# Patient Record
Sex: FEMALE | Race: WHITE | Employment: UNEMPLOYED | URBAN - METROPOLITAN AREA
[De-identification: names, ages, dates, MRNs, and addresses within clinical notes are randomized per-mention and may not be internally consistent; named-entity substitution may affect disease eponyms.]

---

## 2019-09-10 LAB
ANTIBODY SCREEN, EXTERNAL: NEGATIVE
CHLAMYDIA, EXTERNAL: NEGATIVE
HBSAG, EXTERNAL: NEGATIVE
HEPATITIS C AB,   EXT: NEGATIVE
HIV, EXTERNAL: NEGATIVE
N. GONORRHEA, EXTERNAL: NEGATIVE
RPR, EXTERNAL: NON REACTIVE
RUBELLA, EXTERNAL: NORMAL
TYPE, ABO & RH, EXTERNAL: NORMAL

## 2020-02-29 LAB — GRBS, EXTERNAL: NEGATIVE

## 2020-03-08 ENCOUNTER — HOSPITAL ENCOUNTER (EMERGENCY)
Age: 20
Discharge: HOME | End: 2020-03-08
Attending: OBSTETRICS & GYNECOLOGY | Admitting: OBSTETRICS & GYNECOLOGY
Payer: MEDICAID

## 2020-03-08 VITALS
DIASTOLIC BLOOD PRESSURE: 64 MMHG | WEIGHT: 169 LBS | TEMPERATURE: 98.2 F | BODY MASS INDEX: 28.85 KG/M2 | SYSTOLIC BLOOD PRESSURE: 121 MMHG | HEART RATE: 91 BPM | HEIGHT: 64 IN

## 2020-03-08 LAB
APPEARANCE UR: CLEAR
BACTERIA URNS QL MICRO: NEGATIVE /HPF
BILIRUB UR QL: NEGATIVE
COLOR UR: NORMAL
EPITH CASTS URNS QL MICRO: NORMAL /LPF
GLUCOSE UR STRIP.AUTO-MCNC: NEGATIVE MG/DL
HGB UR QL STRIP: NEGATIVE
HYALINE CASTS URNS QL MICRO: NORMAL /LPF (ref 0–5)
KETONES UR QL STRIP.AUTO: NEGATIVE MG/DL
LEUKOCYTE ESTERASE UR QL STRIP.AUTO: NEGATIVE
NITRITE UR QL STRIP.AUTO: NEGATIVE
PH UR STRIP: 7 [PH] (ref 5–8)
PROT UR STRIP-MCNC: NEGATIVE MG/DL
RBC #/AREA URNS HPF: NORMAL /HPF (ref 0–5)
SP GR UR REFRACTOMETRY: 1.01 (ref 1–1.03)
UA: UC IF INDICATED,UAUC: NORMAL
UROBILINOGEN UR QL STRIP.AUTO: 0.2 EU/DL (ref 0.2–1)
WBC URNS QL MICRO: NORMAL /HPF (ref 0–4)

## 2020-03-08 PROCEDURE — 81001 URINALYSIS AUTO W/SCOPE: CPT

## 2020-03-08 PROCEDURE — 59025 FETAL NON-STRESS TEST: CPT

## 2020-03-08 RX ORDER — LEVOTHYROXINE SODIUM 50 UG/1
50 TABLET ORAL
COMMUNITY

## 2020-03-08 NOTE — DISCHARGE INSTRUCTIONS
Patient Education   Patient Education   Patient Education        Early Stage of Labor at Home: Care Instructions  Your Care Instructions    If you came to the hospital while in early labor, your doctor may have asked if you want to labor at home until your contractions are stronger. Many women stay at home during early labor. This is often the longest part of the birthing process. It may last up to 2 to 3 days. Contractions are mild to moderate and shorter (about 30 to 45 seconds). You can usually keep talking during them. Contractions may also be irregular, about 5 to 20 minutes apart. They may even stop for a while. It helps to stay as relaxed as you can during this time. You can spend some or all of your early labor at home or anywhere else you may be comfortable. If you live far from the hospital or birthing center, you may want to think about going somewhere nearby so you can get back to the hospital quickly. For some women, there may be benefits to staying home during early labor, such as avoiding medicines or procedures. As labor progresses, you'll shift from early labor to active labor. During this time, contractions get more intense. They occur more often, about every 2 to 3 minutes. They also last longer, about 50 to 70 seconds. You will feel them even when you change positions and walk or move around. It may be hard to tell if you are in active labor. If you aren't sure, call your doctor or midwife. As your labor progresses, check in with your doctor or midwife about when to come back to the hospital or birthing center. You may have special instructions if your water broke or you tested positive for group B strep. Follow-up care is a key part of your treatment and safety. Be sure to make and go to all appointments, and call your doctor if you are having problems. It's also a good idea to know your test results and keep a list of the medicines you take. How can you care for yourself at home?   · Get support. Having a support person with you from early labor until after childbirth can have a positive effect on childbirth. · Find distractions. During early labor, you can walk, play cards, watch TV, or listen to music to help take your mind off your contractions. · Ask your partner, labor , or  for a massage. Shoulder and low back massage during contractions may ease your pain. Strong massage of the back muscles (counterpressure) during contractions may help relieve the pain of back labor. Tell your labor  exactly where to push and how hard to push. · Use imagery. This means using your imagination to decrease your pain. For instance, to help manage pain, picture your contractions as waves rolling over you. Picture a peaceful place, such as a beach or mountain stream, to help you relax between contractions. · Change positions during labor. Walking, kneeling, or sitting on a big rubber ball (birth ball) are good options. · Use focused breathing techniques. Breathing in a rhythm can distract you from pain. · Take a warm shower or bath. Warm water may ease pain and stress. When should you call for help? Call 911 anytime you think you may need emergency care. For example, call if:    · You passed out (lost consciousness).     · You have a seizure.     · You have severe vaginal bleeding.     · You have severe pain in your belly or pelvis.     · You have had fluid gushing or leaking from your vagina and you know or think the umbilical cord is bulging into your vagina. If this happens, immediately get down on your knees so your rear end (buttocks) is higher than your head. This will decrease the pressure on the cord until help arrives.   [facility] your doctor now or seek immediate medical care if:    · You have new or worse signs of preeclampsia, such as:  ? Sudden swelling of your face, hands, or feet. ? New vision problems (such as dimness, blurring, or seeing spots).   ? A severe headache.     · You have any vaginal bleeding.     · You have belly pain or cramping.     · You have a fever.     · You have had regular contractions (with or without pain) for an hour. This means that you have 8 or more within 1 hour or 4 or more in 20 minutes after you change your position and drink fluids.     · You have a sudden release of fluid from your vagina.     · You have low back pain or pelvic pressure that does not go away.     · You notice that your baby has stopped moving or is moving much less than normal.    Watch closely for changes in your health, and be sure to contact your doctor if you have any problems. Where can you learn more? Go to http://cosme-zeyad.info/. Enter D880 in the search box to learn more about \"Early Stage of Labor at Home: Care Instructions. \"  Current as of: May 29, 2019  Content Version: 12.2  © 7136-4517 [employer]. Care instructions adapted under license by [employer] (which disclaims liability or warranty for this information). If you have questions about a medical condition or this instruction, always ask your healthcare professional. [place] [address] any warranty or liability for your use of this information. Danney Vesta Contractions: Care Instructions  Your Care Instructions    Kiet Fu contractions prepare your uterus for labor. Think of them as a \"warm-up\" exercise that your body does. You may begin to feel them between the 28th and 30th weeks of your pregnancy. But they start as early as the 20th week. Kiet Fu contractions usually occur more often during the ninth month. They may go away when you are active and return when you rest. These contractions are like mild contractions of true labor, but they occur less often. (You feel fewer than 8 in an hour.) They don't cause your cervix to open.   It may be hard for you to tell the difference between Danney Vesta contractions and true labor, especially in your first pregnancy. Follow-up care is a key part of your treatment and safety. Be sure to make and go to all appointments, and call your doctor if you are having problems. It's also a good idea to know your test results and keep a list of the medicines you take. How can you care for yourself at home? · Try a warm bath to help relieve muscle tension and reduce pain. · Change positions every 30 minutes. Take breaks if you must sit for a long time. Get up and walk around. · Drink plenty of water, enough so that your urine is light yellow or clear like water. · Taking short walks may help you feel better. Your doctor needs to check any contractions that are getting stronger or closer together. Where can you learn more? Go to http://cosme-zeyad.info/. Enter 094 [id number] [id number] in the search box to learn more about \"Lea Fu Contractions: Care Instructions. \"  Current as of: May 29, 2019  Content Version: 12.2  © 7951-1573 [employer]. Care instructions adapted under license by [employer] (which disclaims liability or warranty for this information). If you have questions about a medical condition or this instruction, always ask your healthcare professional. [place] [address] any warranty or liability for your use of this information. Counting Your Baby's Kicks: Care Instructions  Your Care Instructions    Counting your baby's kicks is one way your doctor can tell that your baby is healthy. Most women--especially in a first pregnancy--feel their baby move for the first time between 16 and 22 weeks. The movement may feel like flutters rather than kicks. Your baby may move more at certain times of the day. When you are active, you may notice less kicking than when you are resting. At your prenatal visits, your doctor will ask whether the baby is active.   In your last trimester, your doctor may ask you to count the number of times you feel your baby move. Follow-up care is a key part of your treatment and safety. Be sure to make and go to all appointments, and call your doctor if you are having problems. It's also a good idea to know your test results and keep a list of the medicines you take. How do you count fetal kicks? · A common method of checking your baby's movement is to count the number of kicks or moves you feel in 1 hour. Ten movements (such as kicks, flutters, or rolls) in 1 hour are normal. Some doctors suggest that you count in the morning until you get to 10 movements. Then you can quit for that day and start again the next day. · Pick your baby's most active time of day to count. This may be any time from morning to evening. · If you do not feel 10 movements in an hour, your baby may be sleeping. Wait for the next hour and count again. When should you call for help? Call your doctor now or seek immediate medical care if:    · You noticed that your baby has stopped moving or is moving much less than normal.    Watch closely for changes in your health, and be sure to contact your doctor if you have any problems. Where can you learn more? Go to http://cosme-zeyad.info/. Enter L413 in the search box to learn more about \"Counting Your Baby's Kicks: Care Instructions. \"  Current as of: May 29, 2019  Content Version: 12.2  © 5623-3126 [employer]. Care instructions adapted under license by [employer] (which disclaims liability or warranty for this information). If you have questions about a medical condition or this instruction, always ask your healthcare professional. [address] any warranty or liability for your use of this information.

## 2020-03-08 NOTE — PROGRESS NOTES
Anh Jones 64 03/24/2020 Allergic to Septra and Cipro. Admitted under triage for lower abdominal cramping. Pt has hx of ovarian cyst and hypothyroid and currently taking levothyroxine 50MCG. Pt denies any further problems with the pregnancy or outside of the pregnancy  4:24 PM Pt watching TV at the present time. 1700  Dr Oliver Donahue called and informed pt is on the unit with c/o cramping. Went over FM tracing reactive. Urine clear that was sent. Order received to discharge the pt home. 9003 DEMARCO Leonard over discharge teaching with the pt. Pt ambulated off the unit all belongings with her.

## 2020-03-22 ENCOUNTER — HOSPITAL ENCOUNTER (INPATIENT)
Age: 20
LOS: 3 days | Discharge: HOME | DRG: 560 | End: 2020-03-25
Attending: OBSTETRICS & GYNECOLOGY | Admitting: OBSTETRICS & GYNECOLOGY
Payer: MEDICAID

## 2020-03-22 PROBLEM — Z34.90 PREGNANCY: Status: ACTIVE | Noted: 2020-03-22

## 2020-03-22 LAB
ALBUMIN SERPL-MCNC: 2.7 G/DL (ref 3.5–5)
ALBUMIN/GLOB SERPL: 0.9 {RATIO} (ref 1.1–2.2)
ALP SERPL-CCNC: 186 U/L (ref 45–117)
ALT SERPL-CCNC: 12 U/L (ref 12–78)
ANION GAP SERPL CALC-SCNC: 11 MMOL/L (ref 5–15)
AST SERPL-CCNC: 11 U/L (ref 15–37)
BASOPHILS # BLD: 0 K/UL (ref 0–0.1)
BASOPHILS NFR BLD: 0 % (ref 0–1)
BILIRUB SERPL-MCNC: 0.3 MG/DL (ref 0.2–1)
BUN SERPL-MCNC: 5 MG/DL (ref 6–20)
BUN/CREAT SERPL: 6 (ref 12–20)
CALCIUM SERPL-MCNC: 8.8 MG/DL (ref 8.5–10.1)
CHLORIDE SERPL-SCNC: 111 MMOL/L (ref 97–108)
CO2 SERPL-SCNC: 18 MMOL/L (ref 21–32)
CREAT SERPL-MCNC: 0.77 MG/DL (ref 0.55–1.02)
CREAT UR-MCNC: 83 MG/DL
DAILY QC (YES/NO)?: YES
DIFFERENTIAL METHOD BLD: ABNORMAL
EOSINOPHIL # BLD: 0.2 K/UL (ref 0–0.4)
EOSINOPHIL NFR BLD: 2 % (ref 0–7)
ERYTHROCYTE [DISTWIDTH] IN BLOOD BY AUTOMATED COUNT: 13.1 % (ref 11.5–14.5)
GLOBULIN SER CALC-MCNC: 3.1 G/DL (ref 2–4)
GLUCOSE SERPL-MCNC: 128 MG/DL (ref 65–100)
HCT VFR BLD AUTO: 32.3 % (ref 35–47)
HGB BLD-MCNC: 10.8 G/DL (ref 11.5–16)
IMM GRANULOCYTES # BLD AUTO: 0.1 K/UL (ref 0–0.04)
IMM GRANULOCYTES NFR BLD AUTO: 1 % (ref 0–0.5)
LDH SERPL L TO P-CCNC: 181 U/L (ref 81–246)
LYMPHOCYTES # BLD: 1.8 K/UL (ref 0.8–3.5)
LYMPHOCYTES NFR BLD: 17 % (ref 12–49)
MCH RBC QN AUTO: 28.1 PG (ref 26–34)
MCHC RBC AUTO-ENTMCNC: 33.4 G/DL (ref 30–36.5)
MCV RBC AUTO: 83.9 FL (ref 80–99)
MONOCYTES # BLD: 0.9 K/UL (ref 0–1)
MONOCYTES NFR BLD: 8 % (ref 5–13)
NEUTS SEG # BLD: 8.1 K/UL (ref 1.8–8)
NEUTS SEG NFR BLD: 72 % (ref 32–75)
NRBC # BLD: 0 K/UL (ref 0–0.01)
NRBC BLD-RTO: 0 PER 100 WBC
PH, VAGINAL FLUID: 5 (ref 5–6.1)
PLATELET # BLD AUTO: 157 K/UL (ref 150–400)
PMV BLD AUTO: 11.2 FL (ref 8.9–12.9)
POTASSIUM SERPL-SCNC: 3.1 MMOL/L (ref 3.5–5.1)
PROT SERPL-MCNC: 5.8 G/DL (ref 6.4–8.2)
PROT UR-MCNC: 48 MG/DL (ref 0–11.9)
PROT/CREAT UR-RTO: 0.6
RBC # BLD AUTO: 3.85 M/UL (ref 3.8–5.2)
SODIUM SERPL-SCNC: 140 MMOL/L (ref 136–145)
URATE SERPL-MCNC: 4.4 MG/DL (ref 2.6–6)
WBC # BLD AUTO: 11 K/UL (ref 3.6–11)

## 2020-03-22 PROCEDURE — 4A1HXCZ MONITORING OF PRODUCTS OF CONCEPTION, CARDIAC RATE, EXTERNAL APPROACH: ICD-10-PCS | Performed by: OBSTETRICS & GYNECOLOGY

## 2020-03-22 PROCEDURE — 83615 LACTATE (LD) (LDH) ENZYME: CPT

## 2020-03-22 PROCEDURE — 59200 INSERT CERVICAL DILATOR: CPT | Performed by: OBSTETRICS & GYNECOLOGY

## 2020-03-22 PROCEDURE — 84156 ASSAY OF PROTEIN URINE: CPT

## 2020-03-22 PROCEDURE — 3E0P7VZ INTRODUCTION OF HORMONE INTO FEMALE REPRODUCTIVE, VIA NATURAL OR ARTIFICIAL OPENING: ICD-10-PCS | Performed by: OBSTETRICS & GYNECOLOGY

## 2020-03-22 PROCEDURE — 86900 BLOOD TYPING SEROLOGIC ABO: CPT

## 2020-03-22 PROCEDURE — 83986 ASSAY PH BODY FLUID NOS: CPT | Performed by: OBSTETRICS & GYNECOLOGY

## 2020-03-22 PROCEDURE — 76815 OB US LIMITED FETUS(S): CPT | Performed by: OBSTETRICS & GYNECOLOGY

## 2020-03-22 PROCEDURE — 36415 COLL VENOUS BLD VENIPUNCTURE: CPT

## 2020-03-22 PROCEDURE — 65270000029 HC RM PRIVATE

## 2020-03-22 PROCEDURE — 84550 ASSAY OF BLOOD/URIC ACID: CPT

## 2020-03-22 PROCEDURE — 85025 COMPLETE CBC W/AUTO DIFF WBC: CPT

## 2020-03-22 PROCEDURE — 75410000002 HC LABOR FEE PER 1 HR: Performed by: OBSTETRICS & GYNECOLOGY

## 2020-03-22 PROCEDURE — 59025 FETAL NON-STRESS TEST: CPT

## 2020-03-22 PROCEDURE — 80053 COMPREHEN METABOLIC PANEL: CPT

## 2020-03-22 PROCEDURE — 75810000275 HC EMERGENCY DEPT VISIT NO LEVEL OF CARE

## 2020-03-22 PROCEDURE — 99285 EMERGENCY DEPT VISIT HI MDM: CPT

## 2020-03-22 RX ORDER — SODIUM CHLORIDE, SODIUM LACTATE, POTASSIUM CHLORIDE, CALCIUM CHLORIDE 600; 310; 30; 20 MG/100ML; MG/100ML; MG/100ML; MG/100ML
125 INJECTION, SOLUTION INTRAVENOUS CONTINUOUS
Status: DISCONTINUED | OUTPATIENT
Start: 2020-03-23 | End: 2020-03-25 | Stop reason: HOSPADM

## 2020-03-22 RX ORDER — NALOXONE HYDROCHLORIDE 0.4 MG/ML
0.4 INJECTION, SOLUTION INTRAMUSCULAR; INTRAVENOUS; SUBCUTANEOUS AS NEEDED
Status: DISCONTINUED | OUTPATIENT
Start: 2020-03-22 | End: 2020-03-24 | Stop reason: HOSPADM

## 2020-03-22 RX ORDER — SODIUM CHLORIDE 0.9 % (FLUSH) 0.9 %
5-40 SYRINGE (ML) INJECTION AS NEEDED
Status: CANCELLED | OUTPATIENT
Start: 2020-03-22

## 2020-03-22 RX ORDER — ONDANSETRON 2 MG/ML
4 INJECTION INTRAMUSCULAR; INTRAVENOUS
Status: DISCONTINUED | OUTPATIENT
Start: 2020-03-22 | End: 2020-03-24 | Stop reason: HOSPADM

## 2020-03-22 RX ORDER — SODIUM CHLORIDE 0.9 % (FLUSH) 0.9 %
5-40 SYRINGE (ML) INJECTION EVERY 8 HOURS
Status: CANCELLED | OUTPATIENT
Start: 2020-03-23

## 2020-03-23 ENCOUNTER — ANESTHESIA (OUTPATIENT)
Dept: LABOR AND DELIVERY | Age: 20
DRG: 560 | End: 2020-03-23
Payer: MEDICAID

## 2020-03-23 ENCOUNTER — ANESTHESIA EVENT (OUTPATIENT)
Dept: LABOR AND DELIVERY | Age: 20
DRG: 560 | End: 2020-03-23
Payer: MEDICAID

## 2020-03-23 LAB
ABO + RH BLD: NORMAL
APTT PPP: 24.2 SEC (ref 22.1–32)
BASOPHILS # BLD: 0 K/UL (ref 0–0.1)
BASOPHILS NFR BLD: 0 % (ref 0–1)
BLOOD GROUP ANTIBODIES SERPL: NORMAL
DIFFERENTIAL METHOD BLD: ABNORMAL
EOSINOPHIL # BLD: 0 K/UL (ref 0–0.4)
EOSINOPHIL NFR BLD: 0 % (ref 0–7)
ERYTHROCYTE [DISTWIDTH] IN BLOOD BY AUTOMATED COUNT: 13.2 % (ref 11.5–14.5)
FIBRINOGEN PPP-MCNC: 369 MG/DL (ref 200–475)
HCT VFR BLD AUTO: 32 % (ref 35–47)
HGB BLD-MCNC: 10.7 G/DL (ref 11.5–16)
IMM GRANULOCYTES # BLD AUTO: 0.1 K/UL (ref 0–0.04)
IMM GRANULOCYTES NFR BLD AUTO: 1 % (ref 0–0.5)
INR PPP: 1 (ref 0.9–1.1)
LYMPHOCYTES # BLD: 1 K/UL (ref 0.8–3.5)
LYMPHOCYTES NFR BLD: 6 % (ref 12–49)
MCH RBC QN AUTO: 28.4 PG (ref 26–34)
MCHC RBC AUTO-ENTMCNC: 33.4 G/DL (ref 30–36.5)
MCV RBC AUTO: 84.9 FL (ref 80–99)
MONOCYTES # BLD: 0.8 K/UL (ref 0–1)
MONOCYTES NFR BLD: 5 % (ref 5–13)
NEUTS SEG # BLD: 13.7 K/UL (ref 1.8–8)
NEUTS SEG NFR BLD: 88 % (ref 32–75)
NRBC # BLD: 0 K/UL (ref 0–0.01)
NRBC BLD-RTO: 0 PER 100 WBC
PLATELET # BLD AUTO: 189 K/UL (ref 150–400)
PMV BLD AUTO: 11.8 FL (ref 8.9–12.9)
PROTHROMBIN TIME: 10.3 SEC (ref 9–11.1)
RBC # BLD AUTO: 3.77 M/UL (ref 3.8–5.2)
SPECIMEN EXP DATE BLD: NORMAL
THERAPEUTIC RANGE,PTTT: NORMAL SECS (ref 58–77)
WBC # BLD AUTO: 15.6 K/UL (ref 3.6–11)

## 2020-03-23 PROCEDURE — 74011250636 HC RX REV CODE- 250/636: Performed by: OBSTETRICS & GYNECOLOGY

## 2020-03-23 PROCEDURE — 76060000078 HC EPIDURAL ANESTHESIA: Performed by: NURSE ANESTHETIST, CERTIFIED REGISTERED

## 2020-03-23 PROCEDURE — 75410000002 HC LABOR FEE PER 1 HR: Performed by: OBSTETRICS & GYNECOLOGY

## 2020-03-23 PROCEDURE — 85730 THROMBOPLASTIN TIME PARTIAL: CPT

## 2020-03-23 PROCEDURE — 0KQM0ZZ REPAIR PERINEUM MUSCLE, OPEN APPROACH: ICD-10-PCS | Performed by: OBSTETRICS & GYNECOLOGY

## 2020-03-23 PROCEDURE — 85025 COMPLETE CBC W/AUTO DIFF WBC: CPT

## 2020-03-23 PROCEDURE — 74011250637 HC RX REV CODE- 250/637: Performed by: OBSTETRICS & GYNECOLOGY

## 2020-03-23 PROCEDURE — 75410000003 HC RECOV DEL/VAG/CSECN EA 0.5 HR: Performed by: OBSTETRICS & GYNECOLOGY

## 2020-03-23 PROCEDURE — 85384 FIBRINOGEN ACTIVITY: CPT

## 2020-03-23 PROCEDURE — 74011000250 HC RX REV CODE- 250: Performed by: NURSE ANESTHETIST, CERTIFIED REGISTERED

## 2020-03-23 PROCEDURE — 74011000250 HC RX REV CODE- 250: Performed by: OBSTETRICS & GYNECOLOGY

## 2020-03-23 PROCEDURE — 65270000029 HC RM PRIVATE

## 2020-03-23 PROCEDURE — 77030014125 HC TY EPDRL BBMI -B: Performed by: ANESTHESIOLOGY

## 2020-03-23 PROCEDURE — 75410000000 HC DELIVERY VAGINAL/SINGLE: Performed by: OBSTETRICS & GYNECOLOGY

## 2020-03-23 PROCEDURE — 74011250636 HC RX REV CODE- 250/636: Performed by: NURSE ANESTHETIST, CERTIFIED REGISTERED

## 2020-03-23 PROCEDURE — 85610 PROTHROMBIN TIME: CPT

## 2020-03-23 PROCEDURE — 74011000258 HC RX REV CODE- 258: Performed by: OBSTETRICS & GYNECOLOGY

## 2020-03-23 PROCEDURE — 36415 COLL VENOUS BLD VENIPUNCTURE: CPT

## 2020-03-23 PROCEDURE — 74011250636 HC RX REV CODE- 250/636

## 2020-03-23 RX ORDER — SODIUM CHLORIDE 0.9 % (FLUSH) 0.9 %
5-40 SYRINGE (ML) INJECTION EVERY 8 HOURS
Status: CANCELLED | OUTPATIENT
Start: 2020-03-23

## 2020-03-23 RX ORDER — LEVOTHYROXINE SODIUM 50 UG/1
50 TABLET ORAL
Status: DISCONTINUED | OUTPATIENT
Start: 2020-03-23 | End: 2020-03-25 | Stop reason: HOSPADM

## 2020-03-23 RX ORDER — BUTORPHANOL TARTRATE 2 MG/ML
2 INJECTION INTRAMUSCULAR; INTRAVENOUS
Status: DISCONTINUED | OUTPATIENT
Start: 2020-03-23 | End: 2020-03-25 | Stop reason: HOSPADM

## 2020-03-23 RX ORDER — CARBOPROST TROMETHAMINE 250 UG/ML
250 INJECTION, SOLUTION INTRAMUSCULAR ONCE
Status: COMPLETED | OUTPATIENT
Start: 2020-03-23 | End: 2020-03-23

## 2020-03-23 RX ORDER — OXYTOCIN/0.9 % SODIUM CHLORIDE 30/500 ML
PLASTIC BAG, INJECTION (ML) INTRAVENOUS
Status: COMPLETED
Start: 2020-03-23 | End: 2020-03-23

## 2020-03-23 RX ORDER — LIDOCAINE HYDROCHLORIDE AND EPINEPHRINE 15; 5 MG/ML; UG/ML
INJECTION, SOLUTION EPIDURAL
Status: SHIPPED | OUTPATIENT
Start: 2020-03-23 | End: 2020-03-23

## 2020-03-23 RX ORDER — MISOPROSTOL 200 UG/1
400 TABLET ORAL ONCE
Status: COMPLETED | OUTPATIENT
Start: 2020-03-23 | End: 2020-03-23

## 2020-03-23 RX ORDER — OXYTOCIN/0.9 % SODIUM CHLORIDE 30/500 ML
0-25 PLASTIC BAG, INJECTION (ML) INTRAVENOUS
Status: DISCONTINUED | OUTPATIENT
Start: 2020-03-24 | End: 2020-03-24 | Stop reason: HOSPADM

## 2020-03-23 RX ORDER — LIDOCAINE HYDROCHLORIDE 10 MG/ML
INJECTION INFILTRATION; PERINEURAL
Status: DISPENSED
Start: 2020-03-23 | End: 2020-03-24

## 2020-03-23 RX ORDER — EPHEDRINE SULFATE/0.9% NACL/PF 50 MG/5 ML
10 SYRINGE (ML) INTRAVENOUS
Status: ACTIVE | OUTPATIENT
Start: 2020-03-23 | End: 2020-03-24

## 2020-03-23 RX ORDER — FENTANYL/BUPIVACAINE/NS/PF 2-1250MCG
10 PREFILLED PUMP RESERVOIR EPIDURAL CONTINUOUS
Status: DISCONTINUED | OUTPATIENT
Start: 2020-03-23 | End: 2020-03-24 | Stop reason: HOSPADM

## 2020-03-23 RX ORDER — TRANEXAMIC ACID 100 MG/ML
1000 INJECTION, SOLUTION INTRAVENOUS ONCE
Status: COMPLETED | OUTPATIENT
Start: 2020-03-23 | End: 2020-03-23

## 2020-03-23 RX ORDER — BUPIVACAINE HYDROCHLORIDE 2.5 MG/ML
INJECTION, SOLUTION EPIDURAL; INFILTRATION; INTRACAUDAL AS NEEDED
Status: DISCONTINUED | OUTPATIENT
Start: 2020-03-23 | End: 2020-03-23 | Stop reason: HOSPADM

## 2020-03-23 RX ORDER — SODIUM CHLORIDE 0.9 % (FLUSH) 0.9 %
5-40 SYRINGE (ML) INJECTION AS NEEDED
Status: CANCELLED | OUTPATIENT
Start: 2020-03-23

## 2020-03-23 RX ADMIN — LIDOCAINE HYDROCHLORIDE AND EPINEPHRINE 2 ML: 15; 5 INJECTION, SOLUTION EPIDURAL at 15:13

## 2020-03-23 RX ADMIN — DINOPROSTONE 10 MG: 10 INSERT VAGINAL at 00:30

## 2020-03-23 RX ADMIN — WATER 2 G: 1 INJECTION INTRAMUSCULAR; INTRAVENOUS; SUBCUTANEOUS at 21:39

## 2020-03-23 RX ADMIN — OXYTOCIN-SODIUM CHLORIDE 0.9% IV SOLN 30 UNIT/500ML 999 MILLI-UNITS/MIN: 30-0.9/5 SOLUTION at 19:49

## 2020-03-23 RX ADMIN — TRANEXAMIC ACID 1000 MG: 1 INJECTION, SOLUTION INTRAVENOUS at 19:52

## 2020-03-23 RX ADMIN — ONDANSETRON 4 MG: 2 INJECTION INTRAMUSCULAR; INTRAVENOUS at 20:23

## 2020-03-23 RX ADMIN — MISOPROSTOL 400 MCG: 200 TABLET ORAL at 19:53

## 2020-03-23 RX ADMIN — Medication 999 MILLI-UNITS/MIN: at 19:49

## 2020-03-23 RX ADMIN — ONDANSETRON 4 MG: 2 INJECTION INTRAMUSCULAR; INTRAVENOUS at 16:47

## 2020-03-23 RX ADMIN — BUPIVACAINE HYDROCHLORIDE 5 ML: 2.5 INJECTION, SOLUTION EPIDURAL; INFILTRATION; INTRACAUDAL; PERINEURAL at 15:17

## 2020-03-23 RX ADMIN — BUTORPHANOL TARTRATE 2 MG: 2 INJECTION, SOLUTION INTRAMUSCULAR; INTRAVENOUS at 12:02

## 2020-03-23 RX ADMIN — Medication 10 ML/HR: at 15:15

## 2020-03-23 RX ADMIN — CARBOPROST TROMETHAMINE 250 MCG: 250 INJECTION, SOLUTION INTRAMUSCULAR at 19:58

## 2020-03-23 RX ADMIN — LEVOTHYROXINE SODIUM 50 MCG: 0.05 TABLET ORAL at 05:40

## 2020-03-23 RX ADMIN — LIDOCAINE HYDROCHLORIDE AND EPINEPHRINE 3 ML: 15; 5 INJECTION, SOLUTION EPIDURAL at 15:10

## 2020-03-23 RX ADMIN — SODIUM CHLORIDE, SODIUM LACTATE, POTASSIUM CHLORIDE, AND CALCIUM CHLORIDE 1000 ML: 600; 310; 30; 20 INJECTION, SOLUTION INTRAVENOUS at 14:50

## 2020-03-23 RX ADMIN — SODIUM CHLORIDE 12.5 MG: 900 INJECTION, SOLUTION INTRAVENOUS at 12:19

## 2020-03-23 NOTE — PROGRESS NOTES
:  at 39.5wks arrives with c/o decreased fetal movement. Pt states she last felt \"small\" fetal movement around 1800. Pt has not had dinner this evening but did try drinking a soda and ate some crackers prior to arrival. Pt denies any complications of pregnancy. : Pt given cranberry juice and 1 liter of ice water to drink at this time. Pt provided with deepthi button and instructed to push everytime she feels any movement regardless if it is big or small. Pt sitting in bed with no signs of distress. Pt very talkative. : Pt notes fetal movement X 7 since arrival. Deepthi's made on EFM. : Pt with elevated BP's. Not in severe range. Pt states she is feeling very anxious about being in the hospital. Pre-E labs drawn. : Pt with questions regarding Pre-E. Pt states she is very anxious and feeling \"panicky\". Educated pt regarding pre-E, s/s, reasons for lab tests, and treatments. Reassured pt that at this time she is doing well. Pt feeling more reassured and trying to relax. Pt asked if she is feeling any contractions. Pt states she only feels \"slight\" tightening with the occassional cramp but nothing uncomfortable. Pt also states that she had her membranes swept in the office Thursday and since then has been spotting intermittently but denies any heavy bleeding or clots. : Dr. Della Hansen notified of pt's arrival, complaints, EFM, elevated BP's with pt stating she is feeling anxious, and that pre-e labs have been sent. Dr. Della Hansen agrees with plan to continue monitoring BP's and await labs. : BP decreasing. Pt states she is starting to \"calm down\" and relax more. Encouraged pt to continue relaxing and to ask questions as they arise. Pt verbalized understanding. : Pt notes fetal movement x 13 since . Jackye Flakes made on EFM.    : Pt called out with c/o feeling a small leaking of fluid. Small wet spot noted on pt's underware. Perineum and underware nitrazine negative. Milky-white discharge noted at introitus. 0000: Dr. No Hudson at bedside evaluating patient and discussing plans for induction. SVE done. Bedside ultrasound done by Dr. No Hudson. Vertex position confirmed. 0017: Pt out of bed to bathroom to void. 0030: Cervidil placed by Dr. No Hudson. Verbal orders to obtain BP every 4 hours. 0211: Pt awake. Denies any discomfort or needs at this time. 0515: Verbal orders from Dr. No Hudson to pull cervidil after 24 hours from insertion and patient may eat breakfast.    0544: Pt awake in bed. Menu given with instructions for ordering breakfast. Pt denies any discomfort or needs at this time. 36: SBAR report given to PDestiney Merlos Medico RN

## 2020-03-23 NOTE — ANESTHESIA PROCEDURE NOTES
Epidural Block    Start time: 3/23/2020 3:00 PM  End time: 3/23/2020 3:21 PM  Performed by: Tami Jarrell CRNA  Authorized by: Tami Jarrell CRNA     Pre-Procedure  Indication: labor epidural    Preanesthetic Checklist: patient identified, risks and benefits discussed, anesthesia consent, patient being monitored, timeout performed and anesthesia consent    Timeout Time: 15:00        Epidural:   Patient position:  Seated  Prep region:  Lumbar  Prep: Chlorhexidine    Location:  L3-4    Needle and Epidural Catheter:   Needle Type:  Tuohy  Injection Technique:  Loss of resistance using saline  Attempts:  1  Catheter Size:  20 G  Catheter at Skin Depth (cm):  12  Depth in Epidural Space (cm):  5  Events: no blood with aspiration, no cerebrospinal fluid with aspiration, no paresthesia and negative aspiration test    Test Dose:  Negative    Assessment:   Catheter Secured:  Tegaderm and tape  Insertion:  Uncomplicated  Patient tolerance:  Patient tolerated the procedure well with no immediate complications

## 2020-03-23 NOTE — PROGRESS NOTES
5304: Assumed care of pt at this time from Michel Pollard 20 at this time in [place] format. 0715: pt lying on right side resting eyes. Pt denies any pain, states she feels some lower abdominal cramping; declines interventions at this time. 3419: pt states she had a small amount of bloody discharge at this time. States she is feeling more of her contractions. Pt informed to let nurse know if they become intense; report any amount of bleeding. Pt verbalizes understanding. 1020: pt sitting in bed; no complaints at this time. 1138: pt called out to nurses station with c/o of pain from contractions; pt requesting pain medication. 1139: this nurse called Dr. Claudene Hal at this time to inform of pt complaints. Md wants this nurse to give stadol and phenergan and will come to L&D in about an hour to remove cervidil and do cervical exam.    1142: pt up to bathroom; states that she thinks her water broke. 1148: pt back to bed with assistance. Pt states \"I feel water coming out\" this nurse noticed clear fluid on her pad. 1150: this nurse performed cervical exam at this time. 2/80/0 underpads changed    1200: stadol given to pt.    1222: pt resting in bed with eyes closed; significant other at side. 1238: Dr. Claudene Hal in pt room to discuss plan of care and review EFM. 1400: Dr. Claudene Hal made aware of pt current bp's; MD wants pt to continue to progess to epidural placement at this time; no new orders given. 1454: pt up to bathroom at this time; Susy Sees CRNA in pt room for epidural placement. 1513: pt lying on right tilt post epidural placement. Pt states she is more comfortable. 1615: Dr. Claudene Hal called to check on pt. This nurse gave update on pt bp's and pt condition. MD wants pt cervix to be checked. 1618: Pt cervical exam done by this nurse; 5/90/0. Pt shaking from labor. 1658: Pt resting with eyes closed at this time.   1825: Dr. Mdaelyn Mejía in pt room at this time; md performed cervical exam; 10/100/+2    1842: Pt began pushing with significant other at side. 1855: Bedside and Verbal shift change report given to RAUL Dalal (oncoming nurse) by Loretta Galvan. Lele Peña (offgoing nurse). Report included the following information SBAR, Procedure Summary, Intake/Output, MAR and Recent Results.

## 2020-03-23 NOTE — H&P
Labor and Delivery Admission Note  3/23/2020    22 y.o., , female, G1 P 0 Estimated Date of Delivery: 3/24/20 by dates and US presents at 44 6/7 weeks with decreased fetal movement. She was placed on monitor and felt baby move well once admitted and had a reactive strip, but had mild range BPs. Labs were done revealing a PCR of 0.6. She arrived at 2131  Reports no bleeding, LOF, or contractions. Denies HA, visual changes, or epigastric pain. Denies fever, sore throat or cough. PNC: Blood type: O            RH: pos            Rubella: immune            SVII serology: NR             GBS status: negative    POB:  G1: current, no complications to date    Pgyn:  Denies STI  Denies abnormal pap      Past Medical History:   Diagnosis Date    Anxiety disorder     not currently medicated.  Psychiatric problem     Thyroid activity decreased     hypothyroid diagnosis in August 2019     Past Surgical History:   Procedure Laterality Date    HX OTHER SURGICAL      wisdom teeth 2016    HX OTHER SURGICAL      left elbow surgery 2003    HX OTHER SURGICAL      left thumb 2006     OB/GYN: Arash Curran      Meds:   Current Facility-Administered Medications   Medication Dose Route Frequency    butorphanol (STADOL) injection 2 mg  2 mg IntraVENous Q3H PRN    promethazine (PHENERGAN) 12.5 mg in 0.9% sodium chloride 50 mL IVPB  12.5 mg IntraVENous Q6H PRN    lactated Ringers infusion  125 mL/hr IntraVENous CONTINUOUS    lactated ringers bolus infusion 500 mL  500 mL IntraVENous PRN    ondansetron (ZOFRAN) injection 4 mg  4 mg IntraVENous Q4H PRN    naloxone (NARCAN) injection 0.4 mg  0.4 mg IntraVENous PRN     Home meds; Levothyroxine 50 mcg daily      Allergies: Allergies   Allergen Reactions    Septra [Sulfamethoprim] Other (comments)     Lowers her platlet count    Ciprofibrate Hives     Pertinent ROS: see HPI     No family history on file.        Social History     Socioeconomic History    Marital status: SINGLE     Spouse name: Not on file    Number of children: Not on file    Years of education: Not on file    Highest education level: Not on file   Occupational History    Not on file   Social Needs    Financial resource strain: Not on file    Food insecurity     Worry: Not on file     Inability: Not on file    Transportation needs     Medical: Not on file     Non-medical: Not on file   Tobacco Use    Smoking status: Never Smoker    Smokeless tobacco: Never Used   Substance and Sexual Activity    Alcohol use: Never     Frequency: Never    Drug use: Never    Sexual activity: Yes     Partners: Male     Birth control/protection: Pill   Lifestyle    Physical activity     Days per week: Not on file     Minutes per session: Not on file    Stress: Not on file   Relationships    Social connections     Talks on phone: Not on file     Gets together: Not on file     Attends Jewish service: Not on file     Active member of club or organization: Not on file     Attends meetings of clubs or organizations: Not on file     Relationship status: Not on file    Intimate partner violence     Fear of current or ex partner: Not on file     Emotionally abused: Not on file     Physically abused: Not on file     Forced sexual activity: Not on file   Other Topics Concern     Service Not Asked    Blood Transfusions Not Asked    Caffeine Concern Not Asked    Occupational Exposure Not Asked   Willem Backer Hazards Not Asked    Sleep Concern Not Asked    Stress Concern Not Asked    Weight Concern Not Asked    Special Diet Not Asked    Back Care Not Asked    Exercise Not Asked    Bike Helmet Not Asked    Seat Belt Not Asked    Self-Exams Not Asked   Social History Narrative    Not on file       OBJECTIVE:  Gravid , female NAD  No data recorded.     Visit Vitals  /84   Pulse 99   Resp 16   Ht 5' 4\" (1.626 m)   Wt 76.7 kg (169 lb)   SpO2 99%   Breastfeeding No   BMI 29.01 kg/m²       Labs: Lab Results   Component Value Date/Time    WBC 11.0 03/22/2020 10:25 PM       Exam:  HEENT:  normal   Lungs:  clear  Cor:  RRR  Abdomen:  Fundal height c/w GA                    Soft, NT< ND, gravid                    Clinical EFW 7 1/2  Fetal heart rate tracing:  Cat I, reactive  Contraction pattern: irregular, not feeling ccontractions  Cervix:  1/50/-3- pt very uncomfortable with exam  Cervidil inserted without difficulty  Fluid:  intact  Est: trace edema, DTR 2+, 1 beat clonus    Impression:  IUP at 39 6/7 weeks evaluated for decreased fetal movement, and found to have pre eclampsia without severe features. Reassuring fetal status. PCR 0.6, other labs within normal limits. Denies symptoms. GBS negative. Plan: IOL for pre eclampsia without severe features at term. Cervidil placed. Continuous monitoring. Pitocin tomorrow.       Cristal Block MD

## 2020-03-23 NOTE — PROGRESS NOTES
3/23/2020  10:47 AM    CM met with MOB to completed initial assessment and begin discharge planning. MOB lives with her mom-Pamela Henderson ([phone]), and stepdad. MOB is not employed and will be home with baby. FOB- Radha Hernandez ([phone]) currently lives/works in [place], he is currently bedside, and has been staying in hotel while MOB has been in. FOB reports he has flexibility with his employer and will be able to take some time off after delivery. MOB plans to breast feed and has breast pump to use at home. [facility]OB [facility] and plans to add baby to this plan. CM discussed process on adding baby to policy, MOB verbalized understanding. MOB ha car seat, bassinet, clothing, bottles and all necessary supplies for baby. MOB plans to follow up with Len  72.. MOB denied needing any services through [facility] at this time. FOB will provide transport at discharge. Care Management Interventions  PCP Verified by CM: Yes(Dr. Moncho Howard)  Mode of Transport at Discharge:  Other (see comment)  Transition of Care Consult (CM Consult): Discharge Planning  Current Support Network: Lives with Spouse, Own Home  Confirm Follow Up Transport: Family  Discharge Location  Discharge Placement: Home with family assistance  Steffanie Whiting

## 2020-03-23 NOTE — ANESTHESIA PREPROCEDURE EVALUATION
Relevant Problems   No relevant active problems       Anesthetic History   No history of anesthetic complications            Review of Systems / Medical History  Patient summary reviewed, nursing notes reviewed and pertinent labs reviewed    Pulmonary  Within defined limits                 Neuro/Psych   Within defined limits           Cardiovascular                    Comments: Pre-Eclampsia- BPs 160s   GI/Hepatic/Renal  Within defined limits              Endo/Other      Hypothyroidism: well controlled       Other Findings            Physical Exam    Airway  Mallampati: II  TM Distance: 4 - 6 cm  Neck ROM: normal range of motion   Mouth opening: Normal     Cardiovascular  Regular rate and rhythm,  S1 and S2 normal,  no murmur, click, rub, or gallop             Dental  No notable dental hx       Pulmonary  Breath sounds clear to auscultation               Abdominal  Abdominal exam normal       Other Findings            Anesthetic Plan    ASA: 2  Anesthesia type: epidural            Anesthetic plan and risks discussed with: Patient and Spouse

## 2020-03-23 NOTE — PROGRESS NOTES
Labor Note    Blima Makenna  [id number]  2000   39w6d      S:  Feeling comfortable with stadol/phenergan. O:    Visit Vitals  /88   Pulse 78   Temp 98.2 °F (36.8 °C)   Resp 16   Ht 5' 4\" (1.626 m)   Wt 76.7 kg (169 lb)   SpO2 99%   Breastfeeding No   BMI 29.01 kg/m²     Cervical Exam  Dilation (cm): 2  Eff: 80 %  Station: 0  Position: Mid  Cervical Consistency: Soft  Vaginal exam done by? : BRADEN Bhatia  Baby Position: Vertex  Presentation: Cephalic  Membrane Status: SROM    Patient Vitals for the past 4 hrs: Mode Fetal Heart Rate Variability Decelerations Accelerations RN Reviewed Strip?   20 1229 External 130 6-25 BPM None Yes Yes   20 1159 External 130 6-25 BPM None Yes Yes   20 1129 External 130 6-25 BPM -- Yes Yes   20 1100 External 130 6-25 BPM None Yes Yes   20 1029 External 130 6-25 BPM None Yes Yes   20 1018 US Readjusted -- -- -- -- --   20 0959 External 125 6-25 BPM None Yes Yes   20 0930 External 125 6-25 BPM None Yes Yes   20 0859 External 125 6-25 BPM None Yes Yes       A/P:  19 y.o.  @ 39w6d- labor   1. CEFM/Omena  2. GBS neg / Rh pos  3. Pitocin as needed  4. Pain control - as needed  5. Herber 4-6 hours, or prn. Expect .       Marisel Delatorre MD  [facility]

## 2020-03-23 NOTE — PROGRESS NOTES
1905: Bedside and Verbal shift change report given to RAUL Terry Che, RN (oncoming nurse) by Rubio Chacko, RN (offgoing nurse). Report included the following information SBAR, Kardex, Procedure Summary, Intake/Output, MAR and Recent Results. : Pt in lithotomy position with legs in stirrups. This RN replacing BRADEN Chacko RN and will remain at bedside for duration of the pushing process    : MD to bedside for delivery    :  of viable male infant: See delivery summary Delivery QBL: 1555ml. MD aware    : [address] started    : Two hour postpartum assessment complete. Total QBL 1640    0510: RN reported to MD pt's CBC results. No further orders received    0720: Hourly rounds preformed by this RN throughout shift. Bedside and Verbal shift change report given to Stephany Sim RN (oncoming nurse) by Christine Love RN (offgoing nurse). Report included the following information SBAR, Kardex, Procedure Summary, Intake/Output, MAR and Recent Results.

## 2020-03-23 NOTE — PROGRESS NOTES
[place] of care note. Assuming care of this 23 yo G1 at 39+6 undergoing IOL for pre-e w/o SF (Pr:Cr 0.6). S/P cervidil overnight and s/p SROM. Has progressed without further intervention. PNC c/b hypothyroid on synthroid. S:  Pt c/o intermittent pressure but otherwise comfortable with epidural.    O:  148/97, 93, 20, 98.3, 98%RA  Gen:  WDWN, NAD  FHT:  125 baseline, moderate variability, +accels, no decels, Cat I/reactive  [place]:  Ctx's q 2-3 min spontaneously  SVE:  C/C/+2    A:  23 yo G1 at 39+6. Pre-e w/o SF. Progressing well. Entering second stage. Reassuring fetal status. GBS neg. Rh pos.  RI. Hypothyroid. P:  1. Begin pushing  2.   Anticipate vaginal delivery

## 2020-03-24 LAB
BASOPHILS # BLD: 0 K/UL (ref 0–0.1)
BASOPHILS NFR BLD: 0 % (ref 0–1)
DIFFERENTIAL METHOD BLD: ABNORMAL
EOSINOPHIL # BLD: 0 K/UL (ref 0–0.4)
EOSINOPHIL NFR BLD: 0 % (ref 0–7)
ERYTHROCYTE [DISTWIDTH] IN BLOOD BY AUTOMATED COUNT: 13.2 % (ref 11.5–14.5)
HCT VFR BLD AUTO: 21.6 % (ref 35–47)
HGB BLD-MCNC: 7.3 G/DL (ref 11.5–16)
IMM GRANULOCYTES # BLD AUTO: 0.2 K/UL (ref 0–0.04)
IMM GRANULOCYTES NFR BLD AUTO: 1 % (ref 0–0.5)
LYMPHOCYTES # BLD: 1.2 K/UL (ref 0.8–3.5)
LYMPHOCYTES NFR BLD: 7 % (ref 12–49)
MCH RBC QN AUTO: 28.6 PG (ref 26–34)
MCHC RBC AUTO-ENTMCNC: 33.8 G/DL (ref 30–36.5)
MCV RBC AUTO: 84.7 FL (ref 80–99)
MONOCYTES # BLD: 1.7 K/UL (ref 0–1)
MONOCYTES NFR BLD: 9 % (ref 5–13)
NEUTS SEG # BLD: 15.2 K/UL (ref 1.8–8)
NEUTS SEG NFR BLD: 83 % (ref 32–75)
NRBC # BLD: 0 K/UL (ref 0–0.01)
NRBC BLD-RTO: 0 PER 100 WBC
PLATELET # BLD AUTO: 123 K/UL (ref 150–400)
PMV BLD AUTO: 10.9 FL (ref 8.9–12.9)
RBC # BLD AUTO: 2.55 M/UL (ref 3.8–5.2)
WBC # BLD AUTO: 18.3 K/UL (ref 3.6–11)

## 2020-03-24 PROCEDURE — 74011250637 HC RX REV CODE- 250/637: Performed by: OBSTETRICS & GYNECOLOGY

## 2020-03-24 PROCEDURE — 85025 COMPLETE CBC W/AUTO DIFF WBC: CPT

## 2020-03-24 PROCEDURE — 36415 COLL VENOUS BLD VENIPUNCTURE: CPT

## 2020-03-24 PROCEDURE — 65270000029 HC RM PRIVATE

## 2020-03-24 RX ORDER — DIPHENHYDRAMINE HCL 25 MG
25 CAPSULE ORAL
Status: DISCONTINUED | OUTPATIENT
Start: 2020-03-24 | End: 2020-03-25 | Stop reason: HOSPADM

## 2020-03-24 RX ORDER — DOCUSATE SODIUM 100 MG/1
100 CAPSULE, LIQUID FILLED ORAL
Status: DISCONTINUED | OUTPATIENT
Start: 2020-03-24 | End: 2020-03-25 | Stop reason: HOSPADM

## 2020-03-24 RX ORDER — HYDROCORTISONE ACETATE PRAMOXINE HCL 2.5; 1 G/100G; G/100G
CREAM TOPICAL AS NEEDED
Status: DISCONTINUED | OUTPATIENT
Start: 2020-03-24 | End: 2020-03-25 | Stop reason: HOSPADM

## 2020-03-24 RX ORDER — OXYCODONE AND ACETAMINOPHEN 5; 325 MG/1; MG/1
1 TABLET ORAL
Status: DISCONTINUED | OUTPATIENT
Start: 2020-03-24 | End: 2020-03-25 | Stop reason: HOSPADM

## 2020-03-24 RX ORDER — ACETAMINOPHEN 325 MG/1
650 TABLET ORAL
Status: DISCONTINUED | OUTPATIENT
Start: 2020-03-24 | End: 2020-03-25 | Stop reason: HOSPADM

## 2020-03-24 RX ORDER — ZOLPIDEM TARTRATE 5 MG/1
5 TABLET ORAL
Status: DISCONTINUED | OUTPATIENT
Start: 2020-03-24 | End: 2020-03-25 | Stop reason: HOSPADM

## 2020-03-24 RX ORDER — NALOXONE HYDROCHLORIDE 0.4 MG/ML
0.4 INJECTION, SOLUTION INTRAMUSCULAR; INTRAVENOUS; SUBCUTANEOUS AS NEEDED
Status: DISCONTINUED | OUTPATIENT
Start: 2020-03-24 | End: 2020-03-25 | Stop reason: HOSPADM

## 2020-03-24 RX ORDER — SWAB
1 SWAB, NON-MEDICATED MISCELLANEOUS DAILY
Status: DISCONTINUED | OUTPATIENT
Start: 2020-03-24 | End: 2020-03-25 | Stop reason: HOSPADM

## 2020-03-24 RX ORDER — IBUPROFEN 800 MG/1
800 TABLET ORAL EVERY 8 HOURS
Status: DISCONTINUED | OUTPATIENT
Start: 2020-03-24 | End: 2020-03-25 | Stop reason: HOSPADM

## 2020-03-24 RX ORDER — ONDANSETRON 2 MG/ML
4 INJECTION INTRAMUSCULAR; INTRAVENOUS
Status: DISCONTINUED | OUTPATIENT
Start: 2020-03-24 | End: 2020-03-25 | Stop reason: HOSPADM

## 2020-03-24 RX ORDER — OXYTOCIN/0.9 % SODIUM CHLORIDE 20/1000 ML
125-500 PLASTIC BAG, INJECTION (ML) INTRAVENOUS ONCE
Status: ACTIVE | OUTPATIENT
Start: 2020-03-24 | End: 2020-03-24

## 2020-03-24 RX ORDER — LANOLIN ALCOHOL/MO/W.PET/CERES
1 CREAM (GRAM) TOPICAL
Status: DISCONTINUED | OUTPATIENT
Start: 2020-03-24 | End: 2020-03-25 | Stop reason: HOSPADM

## 2020-03-24 RX ORDER — SIMETHICONE 80 MG
80 TABLET,CHEWABLE ORAL
Status: DISCONTINUED | OUTPATIENT
Start: 2020-03-24 | End: 2020-03-25 | Stop reason: HOSPADM

## 2020-03-24 RX ADMIN — IBUPROFEN 800 MG: 800 TABLET ORAL at 22:44

## 2020-03-24 RX ADMIN — IBUPROFEN 800 MG: 800 TABLET ORAL at 05:47

## 2020-03-24 RX ADMIN — LEVOTHYROXINE SODIUM 50 MCG: 0.05 TABLET ORAL at 05:47

## 2020-03-24 NOTE — L&D DELIVERY NOTE
Delivery Summary    Patient: Casandra Hussein MRN: [id number]  SSN: [id number]    YOB: 2000  Age: 22 y.o. Sex: female       Information for the patient's :  [facility] [[id number]]       Labor Events:    Labor: No    Steroids: None   Cervical Ripening Date/Time:       Cervical Ripening Type: Cervidil   Antibiotics During Labor:     Rupture Identifier:      Rupture Date/Time: 3/23/2020 11:45 AM   Rupture Type: SROM   Amniotic Fluid Volume:      Amniotic Fluid Description: Clear    Amniotic Fluid Odor: None    Induction: Prostaglandins       Induction Date/Time: 3/23/2020 12:30 AM    Indications for Induction: Mild Preeclampsia    Augmentation:     Augmentation Date/Time:      Indications for Augmentation:     Labor complications: None       Additional complications:        Delivery Events:  Indications For Episiotomy:     Episiotomy: None   Perineal Laceration(s): 2nd   Repaired: No    Periurethral Laceration Location:      Repaired:     Labial Laceration Location:     Repaired:     Sulcal Laceration Location:     Repaired:     Vaginal Laceration Location:     Repaired:     Cervical Laceration Location:     Repaired:     Repair Suture: Vicryl 3-0; Other (See Note)   Number of Repair Packets: 2   Estimated Blood Loss (ml):  ml     Delivery Date: 3/23/2020    Delivery Time: 7:47 PM  Delivery Type: Vaginal, Spontaneous  Sex:  Male    Gestational Age: 37w11d   Delivery Clinician:  Terrence Aguilar  Living Status: Living   Delivery Location: L&D L & D 209          APGARS  One minute Five minutes Ten minutes   Skin color: 1   1        Heart rate: 2   2        Grimace: 2   2        Muscle tone: 2   2        Breathin   2        Totals: 8   9            Presentation: Vertex    Position: Left Occiput Anterior  Resuscitation Method:  Tactile Stimulation;Suctioning-bulb     Meconium Stained: None      Cord Information: 3 Vessels  Complications: Nuchal Cord Without Compressions  Cord around: head  Delayed cord clamping? Yes  Cord clamped date/time:3/23/2020  7:48 PM  Disposition of Cord Blood: Lab    Blood Gases Sent?:      Placenta:  Date/Time: 3/23/2020  7:51 PM  Removal: Spontaneous      Appearance: Normal     [place] Measurements:  Birth Weight: 3.845 kg      Birth Length: 49.5 cm      Head Circumference: 36 cm      Chest Circumference: 35 cm     Abdominal Girth: 33 cm    Other Providers:   KENY Ocampo;PHILIPPE CALDERON;CURTIS RAMIREZ;CHAD GTZ;BEBE GOODE, Obstetrician;Primary Nurse;Charge Nurse;Tech;Nursery Nurse           Group B Strep:   Lab Results   Component Value Date/Time    GrCHATOtrep, External negative 2020     Delivery Narrative:  Patient progressed well to C/C/+2 and pushed for approximately 1 hr with  of liveborn male infant, APGARS 8/9, weight 3845 gm. Head delivered direct OA. Anterior (left) shoulder delivered with single maternal effort with posterior shoulder and body following easily. Infant placed on maternal abdomen. Almost immediately following delivery brisk bleeding was noted. Pitocin infusion initiated. On inspection 2nd degree laceration noted with rectal capsule visible but intact. No other lacerations seen. Bleeding appeared to be uterine. Cord clamped x 2 and cut by FOB at the 1 min deepthi. Placenta delivered intact with 3v cord. Brisk bleeding continued. Uterine atony noted despite aggressive bimanual massage and draining bladder of 150 ml clear yellow urine. Multiple manual sweeps of uterus notable for no retained products but reformation of clots. 400 mcg Cytotec placed buccally. TXA 1 gm x 1 IV. Hemabate 0.25 mg IM x 1. Pt was not a methergine candidate due to IOL for pre-eclampsia. A second IV was placed and a CBC, Type & Cross, PT/PTT, Fibrinogen drawn. Uterine tone improved.   Intact rectal capsule reinforced as it was visible using two O-vicryl interrupted stitches and the 2nd degree was repaired in standard fashion using 3-0 vicryl. Fundus firm at U. Mother and baby bonding in LDR. Delivery QBL 1555 ml. Two hour total post delivery QBL pending. Ancef 2 gm IV x 1 due to intrauterine manipulation.

## 2020-03-24 NOTE — PROGRESS NOTES
Post-Partum Day Number 1 Progress Note    Patient doing well post-partum without significant complaint. No HA, vision changes. Denies fatigue,dizziness    Vitals:    Patient Vitals for the past 8 hrs:   BP Temp Pulse Resp SpO2   20 0735 127/65 99 °F (37.2 °C) (!) 141 18 --   20 0547 133/70 -- (!) 120 -- --   20 0330 -- 99.6 °F (37.6 °C) -- -- --   20 0259 122/60 100.1 °F (37.8 °C) (!) 135 16 99 %     Temp (24hrs), Av.9 °F (37.2 °C), Min:97.9 °F (36.6 °C), Max:100.1 °F (37.8 °C)      Vital signs stable, afebrile. Exam:  Patient without distress. Abdomen soft, fundus firm at level of umbilicus, nontender                            Lower extremities are negative for swelling, cords or tenderness. Lab/Data Review: All lab results for the last 24 hours reviewed. Assessment and Plan:  Patient appears to be having uncomplicated post-partum course. Continue routine perineal care and maternal education. Plan discharge tomorrow if no problems occur. 2. S/p Preeclampsia without severe features  3. S/p pp hemorrhage--seems stable but tachycardic without current sx.  Will monitor

## 2020-03-24 NOTE — LACTATION NOTE
This note was copied from a baby's chart. This is mother's first baby. She has been reading about breastfeeding. Mother states baby has nursed well several times but he is sleepy and spity and will not latch on. Breastfeeding attempted. Mother able to easily hand express drops - 18 drops of colostrum easily expressed and finger fed to baby. Discussed with mother her plan for feeding. Reviewed the benefits of exclusive breast milk feeding during the hospital stay. Informed her of the risks of using formula to supplement in the first few days of life as well as the benefits of successful breast milk feeding; referred her to the Breastfeeding booklet about this information. She acknowledges understanding of information reviewed and states that it is her plan to breast and formula feed her infant. Will support her choice and offer additional information as needed. Encouraged mom to attempt feeding with baby led feeding cues. Just as sucking on fingers, rooting, mouthing. Looking for 8-12 feedings in 24 hours. Don't limit baby at breast, allow baby to come of breast on it's own. Baby may want to feed  often and may increase number of feedings on second day of life. Skin to skin encouraged. If baby doesn't nurse,  Mom should  hand express  10-20 drops of colostrum and drip into baby's mouth, or give to baby by finger feeding, cup feeding, or spoon feeding at least every 2-3 hours. Pt will successfully establish breastfeeding by feeding in response to early feeding cues   or wake every 3h, will obtain deep latch, and will keep log of feedings/output. Taught to BF at hunger cues and or q 2-3 hrs and to offer 10-20 drops of hand expressed colostrum at any non-feeds.       Breast Assessment  Left Breast: Medium  Left Nipple: Everted, Intact  Right Breast: Medium  Right Nipple: Everted, Intact  Breast- Feeding Assessment  Attends Breast-Feeding Classes: No(Read about breastfeeding)  Breast-Feeding Experience: No  Breast Trauma/Surgery: No  Type/Quality: Good  Lactation Consultant Visits  Breast-Feedings: Attempted breast-feeding(Baby sleepy - mother able to hand express 18 drops which were finger fed to baby)  Mother/Infant Observation  Infant Observation: Frenulum checked  Mother given breastfeeding handouts.

## 2020-03-24 NOTE — PROGRESS NOTES
0720 Bedside and Verbal shift change report given to Kalpana Mena RN (oncoming nurse) by Princess Boles RN (offgoing nurse). Report included the following information SBAR, Intake/Output, MAR, Accordion and Recent Results. Patient is a  this RN assumed care of patient at this time. 2562 Patient MEWS score noted to be 4. Patients heart rate is 141 and her temperature is 99.0. Patient denies any chest pain or shortness of breath. Patient is educated to call RN for assistance up to the bathroom. Patient verbalizes understanding. 7974 RN updates Dr. Adriel Jackson on patient vital signs. VORB to repeat VS every 2 hours. RN informs MD that she will monitor patients HR.    0801 RN bedside this RN applied continuous pulse oximetry for monitoring. RN educates patient to call if she begins to experience any chest tightness or shortness of breath. Patient verbalizes understanding. 8629 RN bedside obtaining VS. Patient denies any needs at this time. [address] bedside VS obtained. Patient denies any needs at this time. [address] [facility] [address] rounded on patient. Denies any needs at this time. 46 RN call to Dr. Song Garrett on patient vital signs. Orders to transfer patient upstairs. 1550 TRANSFER - OUT REPORT:    Verbal report given to KOFI Max RN (name) on Zaid Wilhelm  being transferred to MIU (unit) for routine progression of care       Report consisted of patients Situation, Background, Assessment and   Recommendations(SBAR). Information from the following report(s) SBAR, Intake/Output, MAR and Recent Results was reviewed with the receiving nurse.     Lines:   Peripheral IV 20 Right Hand (Active)   Site Assessment Clean, dry, & intact 3/24/2020  7:35 AM   Phlebitis Assessment 0 3/24/2020  7:35 AM   Infiltration Assessment 0 3/24/2020  7:35 AM   Dressing Status Clean, dry, & intact 3/24/2020  7:35 AM   Dressing Type Transparent;Tape 3/24/2020  7:35 AM   Hub Color/Line Status Pink;Flushed;Patent 3/24/2020  7:35 AM        Opportunity for questions and clarification was provided.       Patient transported with:   Registered Nurse

## 2020-03-24 NOTE — DISCHARGE INSTRUCTIONS
Discharge Instructions for Vaginal Delivery    Patient ID:  Hardeep Jensen  [id number]  76 y.o.  2000    Take Home Medications           Continue taking your prenatal vitamins if you are breastfeeding. Follow-up Appointment:  Follow-up with vpfw in 6 weeks. Follow-up care is a key part of your treatment and safety. Be sure to make and go to all appointments, and call your doctor if you are having problems. Its also a good idea to know your test results and keep a list of the medicines you take. Activity  Avoid anything in your vagina for 6 weeks (no intercourse, tampons, or douching). You may drive unless you are taking prescription pain medications. Climbing stairs and light lifting are ok after a vaginal delivery unless your doctor tells you not to. You may gradually work up to exercise over the next few weeks, but take it easy- you'll be tired! Diet  You may eat a regular diet but you may want to avoid heavy, greasy foods and other foods that could increase constipation. Wound care  If you have stitches, continue to rinse with a squirt bottle of warm water each time you use the bathroom for about 2 weeks. Your stitches will gradually dissolve over several weeks. Sitz baths are also helpful to keep the wound clean, encourage healing, and to help with pain associated with the stitches or hemorrhoids. You can use either a sitz bath basin or a bathtub filled with 2-3\" inches of plain warm water. Soak for 10 minutes 3 times a day. Pain Management  If you were not given a prescription pain medication, you can take over the counter pain medicines like Tylenol (acetominophen), Advil or Motrin (ibuprofen). You can take acetominophen and ibuprofen together, alternating doses every few hours.   You will get the most relief if you take the maximum dose:  · Tylenol or acetominophen 1000 mg every 6 hours (equivalent to 2 Extra Strength Tylenols every 6 hours)  · Motrin or Advil (generic ibuprofen) 800 mg every 8 hours (4 tablets or capsules every 8 hours)  If you were given a prescription pain medication, you can take ibuprofen along with it (doses as above), but not Tylenol. Use ibuprofen as the main medication, and take the prescription medication if needed for more severe pain not relieved by the ibuprofen. Your goal should be to take only the minimum necessary amounts of the prescription medication (narcotic), as these pain medicines can be habit-forming and will worsen or cause constipation. Most patients will find that within a couple of days, their pain is adequately controlled using only over-the-counter medications. A heating pad can also be very helpful for cramping or back pain. Over-the-counter hemorrhoid wipes and ointments are fine to use if you have hemorrhoids. Constipation  You may find that bowel movements are irregular after delivery and that you have a tendency to be constipated. If you have stitches (and especially if you had a more severe tear called a third- or fourth-degree), your bowel movements will be more comfortable if they are soft. A stool softener such as Colace (docusate) can safely be taken daily if needed. If you become constipated you can use a laxative such as Dulcolax, Miralax or Milk of Magnesia. Don't wait until constipation is severe- take something sooner rather than later and you will feel much better! Your Recovery: What to Expect at Home  Delivering a baby is hard work and you probably aren't getting much sleep, so you will certainly be tired. Try to rest when you can and don't worry about doing housework or other tasks which can wait. If you're experiencing soreness or swelling in your bottom, this should improve over the next few days to 2 weeks. You are likely to have some back pain or general body aches or muscle soreness. This should improve with acetominophen or ibuprofen.   If your legs were swollen during your pregnancy or as a result of IV fluids given during your hospital stay, this should go away in a few days to a week. Most women experience some form of the \"Baby Blues\" after having a baby. This means that you may feel emotional, tearful, frustrated, anxious, sad, and irritable some of the time. This is normal if it's not severe and should go away after about 2 weeks. Getting as much rest as you can will help. Accept assistance from friends and family members so that you can take breaks from caring for the baby. Call your doctor if your symptoms seem severe, last more than 2 weeks, or seem to be getting worse instead of better. Get help immediately if you have thoughts of wanting to hurt yourself or others! When should you call for help? Call 911 anytime you think you may need emergency care. For example, call if:  You pass out (lose consciousness). You have sudden chest pain and shortness of breath, or you cough up blood. You have severe pain in your belly. Call your doctor now or seek immediate medical care if:  You have heavy vaginal bleeding that soaks one or more pads in an hour, or you have large clots (golf ball size or larger). Your have foul-smelling discharge from your vagina. You are sick to your stomach or cannot keep fluids down. You have pain that does not get better after you take pain medicine. You have signs of infection, such as: Increased pain in your abdomen or vaginal area  Red streaks, warmth, or tenderness of your breasts. A fever of 101 or greater (taken by mouth). You have signs of a blood clot, such as:  Pain in your calf, back of knee, thigh, or groin. Redness and swelling in your leg or groin. You have trouble passing urine or stool, especially if you have pain or swelling in your lower belly; or if you are unable to have a bowel movement after taking a laxative. You have a fast or pounding heartbeat.

## 2020-03-24 NOTE — DISCHARGE SUMMARY
Patient ID:  Shantell Pena  [id number]  2 y.o.  2000    Admit Date: 3/22/2020    Discharge Date: 3/25/2020    Admitting Physician: Kalyani Dong MD    Attending Physician: Martin Webster MD    Admission Diagnoses: Pregnancy [Z34.90]    Procedures for this admission:     Discharge Diagnoses: Same as above with vaginaly producing a viable infant. Information for the patient's :  [facility] [[id number]]            Discharge Disposition:  Home      Discharge Condition:  stable    Additional Diagnoses: preeclampsia. Maternal Labs:   Lab Results   Component Value Date/Time    HBsAg, External negative 09/10/2019    HIV, External negative 09/10/2019    Rubella, External Immune 09/10/2019    RPR, External non reactive 09/10/2019    GrBStrep, External negative 2020       Cord Blood Results:   Information for the patient's :  [facility] [[id number]]     Lab Results   Component Value Date/Time    ABO/Rh(D) B POSITIVE 2020 08:19 PM    ATILIO IgG NEG 2020 08:19 PM    Bilirubin if ATILIO pos: IF DIRECT LIOR POSITIVE, BILIRUBIN TO FOLLOW 2020 08:19 PM           History of Present Illness:   OB History        1    Para   1    Term   1            AB        Living   1       SAB        TAB        Ectopic        Molar        Multiple   0    Live Births   1              Admitted for induction of labor. Hospital Course:   Patient was admitted as above and delivered via vagina by DR. Marcus . Please the chart for details. The postpartum course was remarkable for pp hemorrhage with close f/u of her H/H which was 6.5/19.8 at admission. Ferrous sulfate prescribed for home and precautions reviewed. She was deemed stable for discharge home on day 2.     Follow-up Care: 7-9 days        Signed:  Chrystal Paget, MD  3/24/2020  11:38 AM

## 2020-03-25 VITALS
RESPIRATION RATE: 16 BRPM | SYSTOLIC BLOOD PRESSURE: 123 MMHG | TEMPERATURE: 98.4 F | OXYGEN SATURATION: 99 % | WEIGHT: 169 LBS | BODY MASS INDEX: 28.85 KG/M2 | HEIGHT: 64 IN | DIASTOLIC BLOOD PRESSURE: 72 MMHG | HEART RATE: 111 BPM

## 2020-03-25 LAB
ERYTHROCYTE [DISTWIDTH] IN BLOOD BY AUTOMATED COUNT: 13.8 % (ref 11.5–14.5)
HCT VFR BLD AUTO: 16.9 % (ref 35–47)
HCT VFR BLD AUTO: 19.8 % (ref 35–47)
HGB BLD-MCNC: 5.5 G/DL (ref 11.5–16)
HGB BLD-MCNC: 6.5 G/DL (ref 11.5–16)
MCH RBC QN AUTO: 28.4 PG (ref 26–34)
MCHC RBC AUTO-ENTMCNC: 32.8 G/DL (ref 30–36.5)
MCV RBC AUTO: 86.5 FL (ref 80–99)
NRBC # BLD: 0 K/UL (ref 0–0.01)
NRBC BLD-RTO: 0 PER 100 WBC
PLATELET # BLD AUTO: 136 K/UL (ref 150–400)
PMV BLD AUTO: 11.2 FL (ref 8.9–12.9)
RBC # BLD AUTO: 2.29 M/UL (ref 3.8–5.2)
WBC # BLD AUTO: 14.6 K/UL (ref 3.6–11)

## 2020-03-25 PROCEDURE — 36415 COLL VENOUS BLD VENIPUNCTURE: CPT

## 2020-03-25 PROCEDURE — 85027 COMPLETE CBC AUTOMATED: CPT

## 2020-03-25 PROCEDURE — 85018 HEMOGLOBIN: CPT

## 2020-03-25 PROCEDURE — 74011250637 HC RX REV CODE- 250/637: Performed by: OBSTETRICS & GYNECOLOGY

## 2020-03-25 RX ORDER — LANOLIN ALCOHOL/MO/W.PET/CERES
325 CREAM (GRAM) TOPICAL
Qty: 30 TAB | Refills: 3 | Status: SHIPPED | OUTPATIENT
Start: 2020-03-25

## 2020-03-25 RX ORDER — IBUPROFEN 800 MG/1
800 TABLET ORAL EVERY 8 HOURS
Qty: 21 TAB | Refills: 1 | Status: SHIPPED | OUTPATIENT
Start: 2020-03-25

## 2020-03-25 RX ADMIN — ACETAMINOPHEN 650 MG: 325 TABLET ORAL at 15:41

## 2020-03-25 RX ADMIN — LEVOTHYROXINE SODIUM 50 MCG: 0.05 TABLET ORAL at 06:34

## 2020-03-25 RX ADMIN — Medication 1 TABLET: at 08:38

## 2020-03-25 RX ADMIN — FERROUS SULFATE TAB 325 MG (65 MG ELEMENTAL FE) 325 MG: 325 (65 FE) TAB at 08:38

## 2020-03-25 RX ADMIN — IBUPROFEN 800 MG: 800 TABLET ORAL at 15:41

## 2020-03-25 RX ADMIN — IBUPROFEN 800 MG: 800 TABLET ORAL at 06:34

## 2020-03-25 NOTE — ROUTINE PROCESS
Patient off unit in stable condition via wheelchair with RN for discharge home per Dr. Vilma Marsh. Patient is to follow up in 6 weeks and is aware. Prescriptions given to patient. Patient denies any headache, dizziness, nausea/vomitting, or pain at this time. Infant in car seat with mother.

## 2020-03-25 NOTE — ROUTINE PROCESS
Bedside shift change report given to Pastor Saira ARELLANO (oncoming nurse) by Mau Sesay RN (offgoing nurse). Report included the following information SBAR, Kardex and MAR.

## 2020-03-25 NOTE — LACTATION NOTE
This note was copied from a baby's chart. Mother and baby for discharge today. Mother states baby had circumcision today. Baby sleepy post procedure - parents reassured this is normal after circumcision. Encouraged mother to do skin to skin and hand express 10-20 drops of colostrum. Reviewed breastfeeding basics:  Supply and demand,breastfeed baby 8-12 times in 24 hr, feed on demand,  stomach size, early  Feeding cues, skin to skin, positioning and baby led latch-on, assymetrical latch with signs of good, deep latch vs shallow, feeding frequency and duration, and log sheet for tracking infant feedings and output. Breastfeeding Booklet and Warm line information given. Discussed typical  weight loss and the importance of infant weight checks with pediatrician 1-2 post discharge. Engorgement Care Guidelines:  Reviewed how milk is made and normal phases of milk production. Taught care of engorged breasts - frequent breastfeeding encouraged, cool packs and motrin as tolerated. Anticipatory guidance shared. Care for sore/tender nipples discussed:  ways to improve positioning and latch practiced and discussed, hand express colostrum after feedings and let air dry, light application of lanolin, hydrogel pads, seek comfortable laid back feeding position, start feedings on least sore side first.    Discussed eating a healthy diet. Instructed mother to eat a variety of foods in order to get a well balanced diet. She should consume an extra 500 calories per day (more than her non-pregnant requirement.) These extra calories will help provide energy needed for optimal breast milk production. Mother also encouraged to \"drink to thirst\" and it is recommended that she drink fluids such as water, fruit/vegetable juice. Nutritious snacks should be available so that she can eat throughout the day to help satisfy her hunger and maintain a good milk supply.     Discussed pumping/storage and preparation of expressed breast milk. Mother will successfully establish breastfeeding by feeding in response to early feeding cues   or wake every 3h, will obtain deep latch, and will keep log of feedings/output. Taught to BF at hunger cues and or q 2-3 hrs and to offer 10-20 drops of hand expressed colostrum at any non-feeds. Breast Assessment  Left Breast: Medium  Left Nipple: Everted, Intact  Right Breast: Medium  Right Nipple: Everted, Intact  Breast- Feeding Assessment  Attends Breast-Feeding Classes: No  Breast-Feeding Experience: No  Breast Trauma/Surgery: No  Type/Quality: Good(Per mother)  Lactation Consultant Visits  Breast-Feedings: (Mother and baby for discharge. Baby just brought back to mother's room from a circumcision. Baby sleepy (parents reassured this is normal post procedure). Breastfeeding attempted- Baby not interested. 20 drops expressed and given to baby. )  Mother/Infant Observation  Infant Observation: Frenulum checked    Chart shows numerous feedings, void, stool WNL. Discussed importance of monitoring outputs and feedings on first week of life. Discussed ways to tell if baby is  getting enough breast milk, ie  voids and stools, change in color of stool, and return to birth wt within 2 weeks. Follow up with pediatrician visit for weight check in 1-2 days (per AAP guidelines.)  Encouraged to call Warm Line  [phone]  for any questions/problems that arise.  Mother also given breastfeeding support group dates and times for any future needs

## 2020-03-25 NOTE — PROGRESS NOTES
0400 Received a critical HGB of 5.5 and a HCT of 16.9 from lab.     0405 Critical value given to Dr Rae Dash. Pt stable and asymptomatic no further orders given at this time.

## 2020-03-25 NOTE — PROGRESS NOTES
Pt without new complaints. H/H is 6.5/19.8. Ambulating well. Wants discharge. Will do so with iron and f/u in a week.

## 2020-03-25 NOTE — PROGRESS NOTES
Post-Partum Day Number 2 Progress/Discharge Note    Patient doing well post-partum without significant complaint. Mild headache and fatigue. Denies dizziness, lightheadedness, ambulating without issues. Vitals:  No data found. Temp (24hrs), Av.4 °F (36.9 °C), Min:98.1 °F (36.7 °C), Max:99 °F (37.2 °C)      Vital signs stable, afebrile. Pulse 115    Exam:  Patient without distress. Abdomen soft, fundus firm at level of umbilicus, mildtly  tender                             Lower extremities are negative for swelling, cords or tenderness. Lab/Data Review: All lab results for the last 24 hours reviewed. Assessment and Plan:  Patient appears to be having uncomplicated post-partum course. Continue routine perineal care and maternal education. Probable discharge for today. 2. Anemia after pp hemorrhage--recheck H/H later this morning, consider transfusion.

## 2021-08-05 ENCOUNTER — HOSPITAL ENCOUNTER (EMERGENCY)
Age: 21
Discharge: HOME | End: 2021-08-05
Attending: EMERGENCY MEDICINE
Payer: MEDICAID

## 2021-08-05 VITALS
SYSTOLIC BLOOD PRESSURE: 93 MMHG | TEMPERATURE: 98 F | OXYGEN SATURATION: 99 % | DIASTOLIC BLOOD PRESSURE: 61 MMHG | WEIGHT: 121.91 LBS | RESPIRATION RATE: 16 BRPM | HEIGHT: 65 IN | BODY MASS INDEX: 20.31 KG/M2 | HEART RATE: 76 BPM

## 2021-08-05 DIAGNOSIS — U07.1 COVID-19: Primary | ICD-10-CM

## 2021-08-05 LAB
COVID-19 RAPID TEST, COVR: DETECTED
SOURCE, COVRS: ABNORMAL

## 2021-08-05 PROCEDURE — 99284 EMERGENCY DEPT VISIT MOD MDM: CPT

## 2021-08-05 PROCEDURE — 74011250637 HC RX REV CODE- 250/637: Performed by: EMERGENCY MEDICINE

## 2021-08-05 PROCEDURE — 87635 SARS-COV-2 COVID-19 AMP PRB: CPT

## 2021-08-05 RX ORDER — IBUPROFEN 800 MG/1
800 TABLET ORAL ONCE
Status: COMPLETED | OUTPATIENT
Start: 2021-08-05 | End: 2021-08-05

## 2021-08-05 RX ORDER — ACETAMINOPHEN 500 MG
1000 TABLET ORAL ONCE
Status: COMPLETED | OUTPATIENT
Start: 2021-08-05 | End: 2021-08-05

## 2021-08-05 RX ADMIN — ACETAMINOPHEN 1000 MG: 500 TABLET ORAL at 04:24

## 2021-08-05 RX ADMIN — IBUPROFEN 800 MG: 800 TABLET, FILM COATED ORAL at 04:23

## 2021-08-05 NOTE — ED NOTES
I have reviewed discharge instructions with the patient. Opportunity for questions and clarification was provided. The patient verbalized understanding. Patient discharged out of the ED via ambulatory with no difficulty and in stable condition.

## 2021-08-05 NOTE — ED PROVIDER NOTES
18-year-old female with reported history of anxiety hypothyroidism, but she tells me she takes no medications is here with concerns for Covid. She has been sick for about a week and a half and her 3-3/1year-old son tested positive and has now recovered in the last few days. She started with fever and cough and headache. She now has none of those symptoms. She lost taste and smell, but otherwise feels well right now. She has been eating and drinking normally, but she says her stepfather woke her up in the middle the night and told her to come here for IV fluids because he thought she was dehydrated. Past Medical History:   Diagnosis Date    Anxiety disorder     not currently medicated.  Psychiatric problem     Thyroid activity decreased     hypothyroid diagnosis in August 2019       Past Surgical History:   Procedure Laterality Date    HX OTHER SURGICAL      wisdom teeth 2016    HX OTHER SURGICAL      left elbow surgery 2003    HX OTHER SURGICAL      left thumb 2006         No family history on file.     Social History     Socioeconomic History    Marital status: SINGLE     Spouse name: Not on file    Number of children: Not on file    Years of education: Not on file    Highest education level: Not on file   Occupational History    Not on file   Tobacco Use    Smoking status: Never Smoker    Smokeless tobacco: Never Used   Substance and Sexual Activity    Alcohol use: Never    Drug use: Never    Sexual activity: Yes     Partners: Male     Birth control/protection: Pill   Other Topics Concern     Service Not Asked    Blood Transfusions Not Asked    Caffeine Concern Not Asked    Occupational Exposure Not Asked    Hobby Hazards Not Asked    Sleep Concern Not Asked    Stress Concern Not Asked    Weight Concern Not Asked    Special Diet Not Asked    Back Care Not Asked    Exercise Not Asked    Bike Helmet Not Asked   [address] Not Asked    Self-Exams Not Asked Social History Narrative    Not on file     Social Determinants of Health     Financial Resource Strain:     Difficulty of Paying Living Expenses:    Food Insecurity:     Worried About Running Out of Food in the Last Year:     [address] in the Last Year:    Transportation Needs:     Lack of Transportation (Medical):  Lack of Transportation (Non-Medical):    Physical Activity:     Days of Exercise per Week:     Minutes of Exercise per Session:    Stress:     Feeling of Stress :    Social Connections:     Frequency of Communication with Friends and Family:     Frequency of Social Gatherings with Friends and Family:     Attends Christianity Services:     Active Member of Clubs or Organizations:     Attends Club or Organization Meetings:     Marital Status:    Intimate Partner Violence:     Fear of Current or Ex-Partner:     Emotionally Abused:     Physically Abused:     Sexually Abused: ALLERGIES: Septra [sulfamethoprim] and Ciprofibrate    Review of Systems   Constitutional: Negative for fever. HENT: Negative for trouble swallowing. Eyes: Negative for visual disturbance. Respiratory: Negative for cough. Cardiovascular: Negative for chest pain. Gastrointestinal: Negative for abdominal pain. Genitourinary: Negative for difficulty urinating. Musculoskeletal: Negative for gait problem. Skin: Negative for rash. Neurological: Negative for headaches. Hematological: Does not bruise/bleed easily. Psychiatric/Behavioral: Negative for sleep disturbance. Vitals:    08/05/21 0245 08/05/21 0315 08/05/21 0345 08/05/21 0415   BP: (!) 96/57 92/60 (!) 93/56 93/61   Pulse:    76   Resp:    16   Temp:    98 °F (36.7 °C)   SpO2: 97% 97% 97% 99%   Weight:       Height:                Physical Exam  Constitutional:       Appearance: Normal appearance. HENT:      Head: Normocephalic.       Nose: Nose normal.      Mouth/Throat:      Mouth: Mucous membranes are moist.   Eyes: Extraocular Movements: Extraocular movements intact. Conjunctiva/sclera: Conjunctivae normal.   Cardiovascular:      Rate and Rhythm: Normal rate. Pulmonary:      Effort: Pulmonary effort is normal. No respiratory distress. Abdominal:      General: Abdomen is flat. Musculoskeletal:         General: Normal range of motion. Skin:     Findings: No rash. Neurological:      General: No focal deficit present. Mental Status: She is alert. Psychiatric:         Behavior: Behavior normal.          MDM  Number of Diagnoses or Management Options  COVID-19  Diagnosis management comments: Well-appearing young female with no chest pain and no shortness of breath and no severe headache and no evidence of dehydration tested positive for Covid with us tonight  I encouraged Tylenol and Motrin and adequate IV hydration  Her heart rate was in the 60s and 70s despite having a relatively low blood pressure of 93/61. She does not know what her baseline pressure is, but thinks it might be low normally. I would expect her to have a high heart rate if she were dehydrated. I gave her some Tylenol and Motrin and told her to return should she have any worsening chest pain or trouble breathing or other concerning symptoms. In the meantime, as long as she is febrile and coughing she should remain away from people and she understands that.          Procedures

## 2021-08-05 NOTE — ED TRIAGE NOTES
Pt ambulatory to triage area with steady gait. She states her son tested positive for COVID on 7/25/21 and believes she may have it now. She c/o fever, weakness, low BP, loss of taste and smell. She denies having SOB, chest pain, or headache at this time.

## 2021-08-06 ENCOUNTER — PATIENT OUTREACH (OUTPATIENT)
Dept: CASE MANAGEMENT | Age: 21
End: 2021-08-06

## 2021-08-09 ENCOUNTER — PATIENT OUTREACH (OUTPATIENT)
Dept: CASE MANAGEMENT | Age: 21
End: 2021-08-09